# Patient Record
Sex: MALE | Race: WHITE | NOT HISPANIC OR LATINO | Employment: FULL TIME | URBAN - METROPOLITAN AREA
[De-identification: names, ages, dates, MRNs, and addresses within clinical notes are randomized per-mention and may not be internally consistent; named-entity substitution may affect disease eponyms.]

---

## 2021-10-21 ENCOUNTER — TRANSFERRED RECORDS (OUTPATIENT)
Dept: HEALTH INFORMATION MANAGEMENT | Facility: CLINIC | Age: 51
End: 2021-10-21
Payer: COMMERCIAL

## 2021-10-28 DIAGNOSIS — Z11.59 ENCOUNTER FOR SCREENING FOR OTHER VIRAL DISEASES: ICD-10-CM

## 2021-10-31 ENCOUNTER — LAB (OUTPATIENT)
Dept: LAB | Facility: CLINIC | Age: 51
End: 2021-10-31
Attending: INTERNAL MEDICINE
Payer: COMMERCIAL

## 2021-10-31 DIAGNOSIS — Z11.59 ENCOUNTER FOR SCREENING FOR OTHER VIRAL DISEASES: ICD-10-CM

## 2021-10-31 LAB — SARS-COV-2 RNA RESP QL NAA+PROBE: NEGATIVE

## 2021-10-31 PROCEDURE — U0003 INFECTIOUS AGENT DETECTION BY NUCLEIC ACID (DNA OR RNA); SEVERE ACUTE RESPIRATORY SYNDROME CORONAVIRUS 2 (SARS-COV-2) (CORONAVIRUS DISEASE [COVID-19]), AMPLIFIED PROBE TECHNIQUE, MAKING USE OF HIGH THROUGHPUT TECHNOLOGIES AS DESCRIBED BY CMS-2020-01-R: HCPCS

## 2021-10-31 PROCEDURE — U0005 INFEC AGEN DETEC AMPLI PROBE: HCPCS

## 2021-11-01 ENCOUNTER — ANESTHESIA EVENT (OUTPATIENT)
Dept: GASTROENTEROLOGY | Facility: CLINIC | Age: 51
End: 2021-11-01
Payer: COMMERCIAL

## 2021-11-01 NOTE — H&P
Copied & Pasted H&P:                             [email or web address]      [email or web address]    Main Phone [phone]                 MD AVILA CARDONA MD KEVIN R. GRULLON, MD JONATHAN T. HOVDA, MD ERIK YI, MD STEVEN SALDANA, MD CHELY JERONIMO, PAC   ALEXIS HUITRON, PAC   FAVIAN GLOVER, PAC   ABIEL MAGDALENO, APRN, CNP   JOVANA AVILA, APRN, CNP   KARYN WILLIAMSON, PAC              Patient       Tristen Rodríguez   Date of Birth       1970     Age:  51 years  Date of Visit       10/21/2021 03:30 PM  Visit Type       Office Visit  Provider Archie Le MD      Office       [facility]   Historian       self  Referring Provider            Arvin Jj MD   This 51 year old male presents for Cough.    History of Present Illness    1.  Cough   The cough has been present since last office visit. The patient perceives the cough as being mildly severe. It lasts varies. The patient describes the cough as moist and productive clear. The symptoms occur frequently. Recently the cough has changed. Symptom is aggravated by postnasal drip. Symptom is relieved by abx and prednisone. Associated symptoms include excessive sputum and post-nasal drainage. Pertinent negatives include chest pain, dyspnea at rest, dyspnea on exertion, heartburn, hemoptysis and reflux.      Pt returns to clinic for a follow up on asthma, LOV 6/2019  The cough that originally brought him in has resolved and now he has a different cough problem  His breathing has been not so great, states he has a lot of mucus in his lungs every day, bright yellow   Hx of silent reflux, saw reflux surgeon, had Linx device & hernia repair in January 2020 - resolved  Lungs continued to feel wet - did have a course of abx (azithromycin & prednisone) and he felt that things greatly improved for a while, but then they returned     A friend of his who is an EMT has put the concern in him that  it could be cardiac related   Hx of COVID mild case in November 2020 - basically he just felt tired    Now on AirDuo 232/14, has Ventolin HFA, Singulair, Zyrtec        # Detail Type Description    1. Assessment Severe persistent asthma, uncomplicated (J45.50).    Plan Orders Further diagnostic evaluations ordered today include(s) Bronchoscopy to be performed and CT Chest WITHOUT Contrast to be performed.         2. Assessment Personal history of pneumonia (recurrent) (Z87.01).         3. Assessment Allergic rhinitis, unspecified (J30.9).         4. Assessment GERD without esophagitis (K21.9).          Assessment Details  Tristen is a 51-year-old non-smoker referred here today for evaluation of persistent cough and wheezing for the last year as well as abnormal CT scan of the chest. 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  Cough is intermittently productive of some sputum.  Prior CT scan of the chest with rather notable left lower lobe infiltrate.  After couple courses of antibiotics, he is feeling better.  Patient describes lab work-up including low immunoglobulins.  Spirometry, lung volumes, diffusion are normal.  Saw GI, had LINX and repeat EGD with minimal evidence of reflux. Sig cough, sputum production and wheezing. George remains normal. Will get CT chest and bronch.    Plan:   -nasal rinse, then flonase, then zyrtec  -continue symbicort  -continue singulair  -start spiriva, two puffs, once daily  -continue reflux medication  -pre-exercise albuterol    -repeat CT scan of chest @ [facility] in [place]  -labwork (to be done same day as bronchoscopy)  -bronchoscopy at [facility] with Dr Le    Patient Instructions  -nasal rinse, then flonase, then zyrtec  -continue symbicort  -continue singulair  -start spiriva, two puffs, once daily  -continue reflux medication  -pre-exercise albuterol    -repeat CT scan of chest @ [facility] in [place]  -labwork (to be done same day as  bronchoscopy)  -bronchoscopy at [facility] with Dr Le   Medical/Surgical/Interim History  Reviewed, no change.   Last detailed document date:05/01/2019.   Family History  Reviewed, no changes.  Last detailed document: 05/01/2019.   Social History  Tobacco use reviewed.  Reviewed, no changes. Last detailed document date: 05/01/2019.        Medications (active prior to today)  Medication Instructions Start Date Stop Date Refilled Elsewhere   Prilosec  // 10/21/2021  Y   ranitidine 75 mg tablet  // 10/21/2021  Y   Ventolin HFA 90 mcg/actuation aerosol inhaler  //   Y   Nasonex  // 10/21/2021  Y   Symbicort 160 mcg-4.5 mcg/actuation HFA aerosol inhaler inhale 2 puff by inhalation route 2 times every day in the morning and evening 05/08/2019 10/21/2021 05/08/2019 N   Singulair  //   Y   AirDuo RespiClick  //   Y   Zyrtec 10 mg tablet  //   Y     Allergies  Ingredient Reaction Medication Name Comment   NO KNOWN ALLERGIES      (Reviewed, no changes.)    Review of Systems  System Neg/Pos Details   Constitutional Negative Chills, Fever and Pain.   ENMT Positive Post-nasal drainage.   ENMT Negative Dysphagia and Sore throat.   Respiratory Positive Cough, Dyspnea, Excessive sputum.   Respiratory Negative Dyspnea at rest, Dyspnea on exertion, Hemoptysis and Wheezing.   Cardio Negative Chest pain and Edema.   GI Negative Abdominal cramping, Abdominal pain, Heartburn and Reflux.    Negative Dysuria and Polyuria (Genitourinary).   Endocrine Negative Cold intolerance and Heat intolerance.   Neuro Negative Confusion/disorientation and Numbness in extremity.   Psych Negative Anxiety and Depression.   Integumentary Negative Acne and Hives.   MS Negative Back pain and Joint swelling.   Hema/Lymph Negative Easy bleeding and Easy bruising.   Allergic/Immuno Positive Allergic rhinitis.   Allergic/Immuno Negative Frequent infections.   Vital Signs   Time BP mm/Hg Pulse /min Resp /min Temp F Ht ft Ht in Ht cm Wt lb Wt kg Weight % BMI kg/m2  BMI % BSA m2 O2 Sat% (rest) O2 Sat% (amb)   3:50 PM   16  6.0 0.00 182.88 219.00 99.337  29.70 0  96    Pulse Oximetry Results:  Min SpO2 O2 Type FiO2 Method Context Pulse Pattern Score Indication Dx code Comment    96 room air  finger probe left hand digit 2 while at rest-sitting             Physical Exam  Exam Findings Details   Constitutional Normal Well developed.   Respiratory * Auscultation - Findings: wheezing-moderate.   Cardiovascular Normal RRR, No murmur   Respiratory Normal Inspection - Normal. Effort - Normal.   Extremity Normal No edema.       Medications (Added, Continued or Stopped today)  Started Medication Directions Instruction Stopped    AirDuo RespiClick       Nasonex   10/21/2021    Prilosec   10/21/2021    ranitidine 75 mg tablet   10/21/2021    Singulair      10/21/2021 Spiriva Respimat 1.25 mcg/actuation solution for inhalation inhale 2 puff by inhalation route  every day     05/08/2019 Symbicort 160 mcg-4.5 mcg/actuation HFA aerosol inhaler inhale 2 puff by inhalation route 2 times every day in the morning and evening  10/21/2021    Ventolin HFA 90 mcg/actuation aerosol inhaler       Zyrtec 10 mg tablet      Provider:   Archie Le MD     Document generated by:  Archie Le 10/21/2021 05:15 PM          Electronically signed by Archie Le MD on 10/21/2021 05:15 PM

## 2021-11-02 ENCOUNTER — APPOINTMENT (OUTPATIENT)
Dept: GENERAL RADIOLOGY | Facility: CLINIC | Age: 51
End: 2021-11-02
Attending: INTERNAL MEDICINE
Payer: COMMERCIAL

## 2021-11-02 ENCOUNTER — HOSPITAL ENCOUNTER (OUTPATIENT)
Facility: CLINIC | Age: 51
Discharge: HOME | End: 2021-11-02
Attending: INTERNAL MEDICINE | Admitting: INTERNAL MEDICINE
Payer: COMMERCIAL

## 2021-11-02 ENCOUNTER — ANESTHESIA (OUTPATIENT)
Dept: GASTROENTEROLOGY | Facility: CLINIC | Age: 51
End: 2021-11-02
Payer: COMMERCIAL

## 2021-11-02 VITALS
RESPIRATION RATE: 18 BRPM | BODY MASS INDEX: 29.8 KG/M2 | OXYGEN SATURATION: 95 % | WEIGHT: 220 LBS | HEIGHT: 72 IN | SYSTOLIC BLOOD PRESSURE: 130 MMHG | DIASTOLIC BLOOD PRESSURE: 81 MMHG | HEART RATE: 59 BPM | TEMPERATURE: 97.9 F

## 2021-11-02 LAB
APPEARANCE FLD: ABNORMAL
APPEARANCE FLD: CLEAR
BASOPHILS # BLD AUTO: 0.1 10E3/UL (ref 0–0.2)
BASOPHILS NFR BLD AUTO: 2 %
BRONCHOSCOPY: NORMAL
COLOR FLD: COLORLESS
COLOR FLD: COLORLESS
CRP SERPL-MCNC: <2.9 MG/L (ref 0–8)
EOSINOPHIL # BLD AUTO: 0.3 10E3/UL (ref 0–0.7)
EOSINOPHIL NFR BLD AUTO: 5 %
EOSINOPHIL NFR FLD MANUAL: 1 %
ERYTHROCYTE [DISTWIDTH] IN BLOOD BY AUTOMATED COUNT: 12.3 % (ref 10–15)
ERYTHROCYTE [SEDIMENTATION RATE] IN BLOOD BY WESTERGREN METHOD: 7 MM/HR (ref 0–20)
HCT VFR BLD AUTO: 43.6 % (ref 40–53)
HGB BLD-MCNC: 14.3 G/DL (ref 13.3–17.7)
IMM GRANULOCYTES # BLD: 0 10E3/UL
IMM GRANULOCYTES NFR BLD: 0 %
LYMPHOCYTES # BLD AUTO: 1.7 10E3/UL (ref 0.8–5.3)
LYMPHOCYTES NFR BLD AUTO: 26 %
LYMPHOCYTES NFR FLD MANUAL: 2 %
LYMPHOCYTES NFR FLD MANUAL: 6 %
MCH RBC QN AUTO: 29.9 PG (ref 26.5–33)
MCHC RBC AUTO-ENTMCNC: 32.8 G/DL (ref 31.5–36.5)
MCV RBC AUTO: 91 FL (ref 78–100)
MONOCYTES # BLD AUTO: 0.6 10E3/UL (ref 0–1.3)
MONOCYTES NFR BLD AUTO: 9 %
MONOS+MACROS NFR FLD MANUAL: 7 %
MONOS+MACROS NFR FLD MANUAL: 9 %
NEUTROPHILS # BLD AUTO: 3.8 10E3/UL (ref 1.6–8.3)
NEUTROPHILS NFR BLD AUTO: 58 %
NEUTS BAND NFR FLD MANUAL: 86 %
NEUTS BAND NFR FLD MANUAL: 89 %
NRBC # BLD AUTO: 0 10E3/UL
NRBC BLD AUTO-RTO: 0 /100
PLATELET # BLD AUTO: 259 10E3/UL (ref 150–450)
RBC # BLD AUTO: 4.78 10E6/UL (ref 4.4–5.9)
WBC # BLD AUTO: 6.5 10E3/UL (ref 4–11)
WBC # FLD AUTO: 367 /UL
WBC # FLD AUTO: 63 /UL

## 2021-11-02 PROCEDURE — 31624 DX BRONCHOSCOPE/LAVAGE: CPT | Performed by: INTERNAL MEDICINE

## 2021-11-02 PROCEDURE — 250N000009 HC RX 250: Performed by: INTERNAL MEDICINE

## 2021-11-02 PROCEDURE — 87186 SC STD MICRODIL/AGAR DIL: CPT | Performed by: INTERNAL MEDICINE

## 2021-11-02 PROCEDURE — 31625 BRONCHOSCOPY W/BIOPSY(S): CPT | Performed by: INTERNAL MEDICINE

## 2021-11-02 PROCEDURE — 250N000025 HC SEVOFLURANE, PER MIN: Performed by: INTERNAL MEDICINE

## 2021-11-02 PROCEDURE — 87205 SMEAR GRAM STAIN: CPT | Performed by: INTERNAL MEDICINE

## 2021-11-02 PROCEDURE — 84999 UNLISTED CHEMISTRY PROCEDURE: CPT | Performed by: INTERNAL MEDICINE

## 2021-11-02 PROCEDURE — 87581 M.PNEUMON DNA AMP PROBE: CPT | Performed by: INTERNAL MEDICINE

## 2021-11-02 PROCEDURE — 87102 FUNGUS ISOLATION CULTURE: CPT | Performed by: INTERNAL MEDICINE

## 2021-11-02 PROCEDURE — 86140 C-REACTIVE PROTEIN: CPT | Performed by: INTERNAL MEDICINE

## 2021-11-02 PROCEDURE — 370N000017 HC ANESTHESIA TECHNICAL FEE, PER MIN: Performed by: INTERNAL MEDICINE

## 2021-11-02 PROCEDURE — 87070 CULTURE OTHR SPECIMN AEROBIC: CPT | Performed by: INTERNAL MEDICINE

## 2021-11-02 PROCEDURE — 87116 MYCOBACTERIA CULTURE: CPT | Performed by: INTERNAL MEDICINE

## 2021-11-02 PROCEDURE — 87305 ASPERGILLUS AG IA: CPT | Performed by: INTERNAL MEDICINE

## 2021-11-02 PROCEDURE — 87210 SMEAR WET MOUNT SALINE/INK: CPT | Performed by: INTERNAL MEDICINE

## 2021-11-02 PROCEDURE — 36415 COLL VENOUS BLD VENIPUNCTURE: CPT | Performed by: INTERNAL MEDICINE

## 2021-11-02 PROCEDURE — 71045 X-RAY EXAM CHEST 1 VIEW: CPT

## 2021-11-02 PROCEDURE — 85652 RBC SED RATE AUTOMATED: CPT | Performed by: INTERNAL MEDICINE

## 2021-11-02 PROCEDURE — 85025 COMPLETE CBC W/AUTO DIFF WBC: CPT | Performed by: INTERNAL MEDICINE

## 2021-11-02 PROCEDURE — 89050 BODY FLUID CELL COUNT: CPT | Performed by: INTERNAL MEDICINE

## 2021-11-02 PROCEDURE — 250N000011 HC RX IP 250 OP 636: Performed by: NURSE ANESTHETIST, CERTIFIED REGISTERED

## 2021-11-02 PROCEDURE — 87107 FUNGI IDENTIFICATION MOLD: CPT | Performed by: INTERNAL MEDICINE

## 2021-11-02 PROCEDURE — 250N000009 HC RX 250: Performed by: NURSE ANESTHETIST, CERTIFIED REGISTERED

## 2021-11-02 PROCEDURE — 87081 CULTURE SCREEN ONLY: CPT | Performed by: INTERNAL MEDICINE

## 2021-11-02 PROCEDURE — 250N000009 HC RX 250: Performed by: ANESTHESIOLOGY

## 2021-11-02 PROCEDURE — 88305 TISSUE EXAM BY PATHOLOGIST: CPT | Mod: TC | Performed by: INTERNAL MEDICINE

## 2021-11-02 PROCEDURE — 258N000003 HC RX IP 258 OP 636: Performed by: NURSE ANESTHETIST, CERTIFIED REGISTERED

## 2021-11-02 PROCEDURE — 82787 IGG 1 2 3 OR 4 EACH: CPT | Performed by: INTERNAL MEDICINE

## 2021-11-02 PROCEDURE — 82785 ASSAY OF IGE: CPT | Performed by: INTERNAL MEDICINE

## 2021-11-02 PROCEDURE — 89051 BODY FLUID CELL COUNT: CPT | Performed by: INTERNAL MEDICINE

## 2021-11-02 PROCEDURE — 87076 CULTURE ANAEROBE IDENT EACH: CPT | Performed by: INTERNAL MEDICINE

## 2021-11-02 PROCEDURE — 82784 ASSAY IGA/IGD/IGG/IGM EACH: CPT | Performed by: INTERNAL MEDICINE

## 2021-11-02 PROCEDURE — 88108 CYTOPATH CONCENTRATE TECH: CPT | Mod: TC | Performed by: INTERNAL MEDICINE

## 2021-11-02 PROCEDURE — 86431 RHEUMATOID FACTOR QUANT: CPT | Performed by: INTERNAL MEDICINE

## 2021-11-02 PROCEDURE — 31623 DX BRONCHOSCOPE/BRUSH: CPT | Performed by: INTERNAL MEDICINE

## 2021-11-02 PROCEDURE — 999N000010 HC STATISTIC ANES STAT CODE-CRNA PER MINUTE: Performed by: INTERNAL MEDICINE

## 2021-11-02 RX ORDER — ALBUTEROL SULFATE 0.83 MG/ML
2.5 SOLUTION RESPIRATORY (INHALATION) ONCE
Status: COMPLETED | OUTPATIENT
Start: 2021-11-02 | End: 2021-11-02

## 2021-11-02 RX ORDER — SODIUM CHLORIDE, SODIUM LACTATE, POTASSIUM CHLORIDE, CALCIUM CHLORIDE 600; 310; 30; 20 MG/100ML; MG/100ML; MG/100ML; MG/100ML
INJECTION, SOLUTION INTRAVENOUS CONTINUOUS PRN
Status: DISCONTINUED | OUTPATIENT
Start: 2021-11-02 | End: 2021-11-02

## 2021-11-02 RX ORDER — CETIRIZINE HYDROCHLORIDE 10 MG/1
10 TABLET ORAL DAILY
COMMUNITY

## 2021-11-02 RX ORDER — SODIUM CHLORIDE, SODIUM LACTATE, POTASSIUM CHLORIDE, CALCIUM CHLORIDE 600; 310; 30; 20 MG/100ML; MG/100ML; MG/100ML; MG/100ML
INJECTION, SOLUTION INTRAVENOUS CONTINUOUS
Status: DISCONTINUED | OUTPATIENT
Start: 2021-11-02 | End: 2021-11-02 | Stop reason: HOSPADM

## 2021-11-02 RX ORDER — ONDANSETRON 4 MG/1
4 TABLET, ORALLY DISINTEGRATING ORAL EVERY 30 MIN PRN
Status: DISCONTINUED | OUTPATIENT
Start: 2021-11-02 | End: 2021-11-02 | Stop reason: HOSPADM

## 2021-11-02 RX ORDER — LIDOCAINE HYDROCHLORIDE 20 MG/ML
INJECTION, SOLUTION INFILTRATION; PERINEURAL PRN
Status: DISCONTINUED | OUTPATIENT
Start: 2021-11-02 | End: 2021-11-02

## 2021-11-02 RX ORDER — ONDANSETRON 2 MG/ML
4 INJECTION INTRAMUSCULAR; INTRAVENOUS EVERY 30 MIN PRN
Status: DISCONTINUED | OUTPATIENT
Start: 2021-11-02 | End: 2021-11-02 | Stop reason: HOSPADM

## 2021-11-02 RX ORDER — DEXAMETHASONE SODIUM PHOSPHATE 4 MG/ML
INJECTION, SOLUTION INTRA-ARTICULAR; INTRALESIONAL; INTRAMUSCULAR; INTRAVENOUS; SOFT TISSUE PRN
Status: DISCONTINUED | OUTPATIENT
Start: 2021-11-02 | End: 2021-11-02

## 2021-11-02 RX ORDER — MAGNESIUM HYDROXIDE 1200 MG/15ML
LIQUID ORAL PRN
Status: COMPLETED | OUTPATIENT
Start: 2021-11-02 | End: 2021-11-02

## 2021-11-02 RX ORDER — PROPOFOL 10 MG/ML
INJECTION, EMULSION INTRAVENOUS CONTINUOUS PRN
Status: DISCONTINUED | OUTPATIENT
Start: 2021-11-02 | End: 2021-11-02

## 2021-11-02 RX ORDER — NALOXONE HYDROCHLORIDE 0.4 MG/ML
0.2 INJECTION, SOLUTION INTRAMUSCULAR; INTRAVENOUS; SUBCUTANEOUS
Status: DISCONTINUED | OUTPATIENT
Start: 2021-11-02 | End: 2021-11-02 | Stop reason: HOSPADM

## 2021-11-02 RX ORDER — ONDANSETRON 4 MG/1
4 TABLET, ORALLY DISINTEGRATING ORAL EVERY 6 HOURS PRN
Status: DISCONTINUED | OUTPATIENT
Start: 2021-11-02 | End: 2021-11-02 | Stop reason: HOSPADM

## 2021-11-02 RX ORDER — ONDANSETRON 2 MG/ML
4 INJECTION INTRAMUSCULAR; INTRAVENOUS EVERY 6 HOURS PRN
Status: DISCONTINUED | OUTPATIENT
Start: 2021-11-02 | End: 2021-11-02 | Stop reason: HOSPADM

## 2021-11-02 RX ORDER — ONDANSETRON 2 MG/ML
INJECTION INTRAMUSCULAR; INTRAVENOUS PRN
Status: DISCONTINUED | OUTPATIENT
Start: 2021-11-02 | End: 2021-11-02

## 2021-11-02 RX ORDER — NALOXONE HYDROCHLORIDE 0.4 MG/ML
0.4 INJECTION, SOLUTION INTRAMUSCULAR; INTRAVENOUS; SUBCUTANEOUS
Status: DISCONTINUED | OUTPATIENT
Start: 2021-11-02 | End: 2021-11-02 | Stop reason: HOSPADM

## 2021-11-02 RX ORDER — MONTELUKAST SODIUM 10 MG/1
10 TABLET ORAL AT BEDTIME
COMMUNITY

## 2021-11-02 RX ORDER — FLUMAZENIL 0.1 MG/ML
0.2 INJECTION, SOLUTION INTRAVENOUS
Status: DISCONTINUED | OUTPATIENT
Start: 2021-11-02 | End: 2021-11-02 | Stop reason: HOSPADM

## 2021-11-02 RX ORDER — PROPOFOL 10 MG/ML
INJECTION, EMULSION INTRAVENOUS PRN
Status: DISCONTINUED | OUTPATIENT
Start: 2021-11-02 | End: 2021-11-02

## 2021-11-02 RX ADMIN — LIDOCAINE HYDROCHLORIDE 100 MG: 20 INJECTION, SOLUTION INFILTRATION; PERINEURAL at 15:10

## 2021-11-02 RX ADMIN — ALBUTEROL SULFATE 2.5 MG: 2.5 SOLUTION RESPIRATORY (INHALATION) at 14:49

## 2021-11-02 RX ADMIN — SODIUM CHLORIDE, POTASSIUM CHLORIDE, SODIUM LACTATE AND CALCIUM CHLORIDE: 600; 310; 30; 20 INJECTION, SOLUTION INTRAVENOUS at 15:06

## 2021-11-02 RX ADMIN — DEXAMETHASONE SODIUM PHOSPHATE 4 MG: 4 INJECTION, SOLUTION INTRA-ARTICULAR; INTRALESIONAL; INTRAMUSCULAR; INTRAVENOUS; SOFT TISSUE at 15:18

## 2021-11-02 RX ADMIN — SODIUM CHLORIDE 260 ML: 0.9 IRRIGANT IRRIGATION at 15:33

## 2021-11-02 RX ADMIN — Medication 20 MCG: at 15:15

## 2021-11-02 RX ADMIN — SUCCINYLCHOLINE CHLORIDE 100 MG: 20 INJECTION, SOLUTION INTRAMUSCULAR; INTRAVENOUS; PARENTERAL at 15:10

## 2021-11-02 RX ADMIN — SUGAMMADEX 200 MG: 100 INJECTION, SOLUTION INTRAVENOUS at 15:41

## 2021-11-02 RX ADMIN — PROPOFOL 200 MG: 10 INJECTION, EMULSION INTRAVENOUS at 15:10

## 2021-11-02 RX ADMIN — ONDANSETRON 4 MG: 2 INJECTION INTRAMUSCULAR; INTRAVENOUS at 15:18

## 2021-11-02 RX ADMIN — ROCURONIUM BROMIDE 30 MG: 50 INJECTION, SOLUTION INTRAVENOUS at 15:15

## 2021-11-02 RX ADMIN — PROPOFOL 150 MCG/KG/MIN: 10 INJECTION, EMULSION INTRAVENOUS at 15:10

## 2021-11-02 ASSESSMENT — MIFFLIN-ST. JEOR: SCORE: 1895.91

## 2021-11-02 ASSESSMENT — LIFESTYLE VARIABLES: TOBACCO_USE: 0

## 2021-11-02 ASSESSMENT — ENCOUNTER SYMPTOMS
DYSRHYTHMIAS: 0
SEIZURES: 0

## 2021-11-02 NOTE — H&P
Pre procedure note      PATIENT NAME: Tristen Rodríguez   MRN:  [id number]  YOB: 1970  Age: 51 year old  VISIT DATE: 10/28/2021      _______________________________________________________________________  Procedure: Bronchoscopy   Indications: cough, bibasilar bronchiectasis / infiltrate  Bronchoscopist: Archie Le MD (Doctor)   General anesthesia/Concious sedation    _______________________________________________________________________    Prior to the procedure a detailed evaluation of the patient's  Chest CT scan was done with detailed planning of the approach route to the areas of interest.  Procedure: Pre-Anesthesia Assessment:     - A History and Physical has been performed. Patient meds and allergies have been reviewed. The risks and benefits of the procedure and the sedation options and risks were discussed with the patient. All questions were answered and informed consent was obtained. Patient identification and proposed procedure were verified prior to the procedure by the physician and the nurse in the procedure room. Mental   Status Examination: alert and oriented. Airway Examination: normal oropharyngeal airway and Mallampati Class 1.    ASA Grade Assessment:3 -  After reviewing the risks and benefits, the patient was deemed in satisfactory condition to undergo the procedure.       A History and Physical has been performed. Patient meds and allergies have been reviewed. The risks and benefits of the procedure and the sedation options and risks were discussed with the patient. All questions were answered and informed consent was obtained.       Archie Le M.D.  [facility] / [facility]  [phone] (pager)  [phone]   (office)

## 2021-11-02 NOTE — ANESTHESIA CARE TRANSFER NOTE
Patient: Tristen Rodríguez    Procedure: Procedure(s):  BRONCHOSCOPY, WITH BRONCHOALVEOLAR LAVAGE  Bronchoscopy, With Biopsy  Bronchoscopy, With [place] Biopsy       Diagnosis: Personal history of pneumonia (recurrent) [Z87.01]  Diagnosis Additional Information: No value filed.    Anesthesia Type:   General     Note:    Oropharynx: oropharynx clear of all foreign objects  Level of Consciousness: awake  Oxygen Supplementation: nasal cannula    Independent Airway: airway patency satisfactory and stable  Dentition: dentition unchanged  Vital Signs Stable: post-procedure vital signs reviewed and stable  Report to RN Given: handoff report given  Patient transferred to: PACU    Handoff Report: Identifed the Patient, Identified the Reponsible Provider, Reviewed the pertinent medical history, Discussed the surgical course, Reviewed Intra-OP anesthesia mangement and issues during anesthesia, Set expectations for post-procedure period and Allowed opportunity for questions and acknowledgement of understanding      Vitals:  Vitals Value Taken Time   /80    Temp     Pulse 68    Resp 12    SpO2 94 % 11/02/21 1601   Vitals shown include unvalidated device data.    Electronically Signed By: THERESA Aguirre CRNA  November 2, 2021  4:02 PM

## 2021-11-02 NOTE — ANESTHESIA PROCEDURE NOTES
Airway       Patient location: endoscopy suite.       Procedure Start/Stop Times: 11/2/2021 3:11 PM  Staff -        Anesthesiologist:  Marcus Kerns MD       CRNA: Edel Theodore APRN CRNA       Performed By: CRNA  Consent for Airway        Urgency: elective  Indications and Patient Condition       Indications for airway management: dajuan-procedural       Induction type:RSI       Mask difficulty assessment: 0 - not attempted    Final Airway Details       Final airway type: endotracheal airway       Successful airway: ETT - single  Endotracheal Airway Details        ETT size (mm): 8.5       Cuffed: yes       Cuff volume (mL): 10       Successful intubation technique: video laryngoscopy       VL Blade Size: [place] 4       Grade View of Cords: 1       Adjucts: stylet       Position: Right       Measured from: lips       Secured at (cm): 24       Bite block used: None    Post intubation assessment        Placement verified by: capnometry, equal breath sounds and chest rise        Number of attempts at approach: 1       Secured with: pink tape       Ease of procedure: easy       Dentition: Intact and Unchanged

## 2021-11-02 NOTE — OR NURSING
Reviewed patient discharge instructions provided by Dr. Le.  Patient verbalized understanding of instructions and has phone numbers to call if concerns or questions once he is home.  Walked patient to , steady on his feet.

## 2021-11-02 NOTE — ANESTHESIA PREPROCEDURE EVALUATION
Anesthesia Pre-Procedure Evaluation    Patient: Tristen Rodríguez   MRN: [id number] : 1970        Preoperative Diagnosis: Personal history of pneumonia (recurrent) [Z87.01]    Procedure : Procedure(s):  BRONCHOSCOPY          Past Medical History:   Diagnosis Date     Seasonal allergies      Uncomplicated asthma       Past Surgical History:   Procedure Laterality Date     HERNIA REPAIR      early      linx inplant       MANDIBLE SURGERY       oral implants        No Known Allergies   Social History     Tobacco Use     Smoking status: Never Smoker     Smokeless tobacco: Never Used   Substance Use Topics     Alcohol use: Yes     Comment: 2 drinks/noc      Wt Readings from Last 1 Encounters:   No data found for Wt        Anesthesia Evaluation   Pt has had prior anesthetic. Type: General.    No history of anesthetic complications       ROS/MED HX  ENT/Pulmonary: Comment: Chronic cough    (+) asthma  (-) tobacco use and sleep apnea   Neurologic:     (+) no peripheral neuropathy  (-) no seizures and no CVA   Cardiovascular:    (-) hypertension, CAD and arrhythmias   METS/Exercise Tolerance:     Hematologic:       Musculoskeletal: Comment: Recent R biceps rupture      GI/Hepatic:     (+) GERD (controlled with implant),     Renal/Genitourinary:    (-) renal disease   Endo:    (-) Type II DM and thyroid disease   Psychiatric/Substance Use:       Infectious Disease:    (-) Recent Fever   Malignancy:       Other:            Physical Exam    Airway  airway exam normal      Mallampati: I   TM distance: > 3 FB   Neck ROM: full   Mouth opening: > 3 cm    Respiratory Devices and Support         Dental  no notable dental history         Cardiovascular   cardiovascular exam normal          Pulmonary           (+) rhonchi           OUTSIDE LABS:  CBC: No results found for: WBC, HGB, HCT, PLT  BMP: No results found for: NA, POTASSIUM, CHLORIDE, CO2, BUN, CR, GLC  COAGS: No results found for: PTT, INR, FIBR  POC: No results  found for: BGM, HCG, HCGS  HEPATIC: No results found for: ALBUMIN, PROTTOTAL, ALT, AST, GGT, ALKPHOS, BILITOTAL, BILIDIRECT, ADDIE  OTHER: No results found for: PH, LACT, A1C, TIP, PHOS, MAG, LIPASE, AMYLASE, TSH, T4, T3, CRP, SED    Anesthesia Plan    ASA Status:  3   NPO Status:  NPO Appropriate    Anesthesia Type: General.     - Airway: ETT   Induction: Intravenous.   Maintenance: TIVA.        Consents    Anesthesia Plan(s) and associated risks, benefits, and realistic alternatives discussed. Questions answered and patient/representative(s) expressed understanding.     - Discussed with:  Patient         Postoperative Care    Pain management: IV analgesics, Oral pain medications.   PONV prophylaxis: Ondansetron (or other 5HT-3), Dexamethasone or Solumedrol, Background Propofol Infusion     Comments:    Pre-op neb.    Had some lightheaded/dizziness after biceps injury this past weekend. No sx since.            Scott Lunsford MD

## 2021-11-03 LAB
GALACTOMANNAN AG BAL QL: NEGATIVE
GALACTOMANNAN AG SPEC IA-ACNC: 0.13
GRAM STAIN RESULT: ABNORMAL
GRAM STAIN RESULT: ABNORMAL
IGA SERPL-MCNC: 284 MG/DL (ref 84–499)
IGE SERPL-ACNC: 9 KU/L (ref 0–114)
IGG SERPL-MCNC: 740 MG/DL (ref 610–1616)
IGM SERPL-MCNC: 35 MG/DL (ref 35–242)
KOH PREPARATION: NORMAL
KOH PREPARATION: NORMAL

## 2021-11-03 NOTE — ANESTHESIA POSTPROCEDURE EVALUATION
Patient: Tristen Rodríguez    Procedure: Procedure(s):  BRONCHOSCOPY, WITH BRONCHOALVEOLAR LAVAGE  Bronchoscopy, With Biopsy  Bronchoscopy, With Brush Biopsy       Diagnosis:Personal history of pneumonia (recurrent) [Z87.01]  Diagnosis Additional Information: No value filed.    Anesthesia Type:  General    Note:  Disposition: Outpatient   Postop Pain Control: Uneventful            Sign Out: Well controlled pain   PONV: No   Neuro/Psych: Uneventful            Sign Out: Acceptable/Baseline neuro status   Airway/Respiratory: Uneventful            Sign Out: Acceptable/Baseline resp. status   CV/Hemodynamics: Uneventful            Sign Out: Acceptable CV status; No obvious hypovolemia; No obvious fluid overload   Other NRE: NONE   DID A NON-ROUTINE EVENT OCCUR? No           Last vitals:  Vitals Value Taken Time   /81 11/02/21 1630   Temp 36.6  C (97.9  F) 11/02/21 1615   Pulse 61 11/02/21 1636   Resp 18 11/02/21 1630   SpO2 94 % 11/02/21 1638   Vitals shown include unvalidated device data.    Electronically Signed By: Chris Russo DO, DO  November 2, 2021  10:09 PM

## 2021-11-04 LAB
PATH REPORT.COMMENTS IMP SPEC: NORMAL
PATH REPORT.FINAL DX SPEC: NORMAL
PATH REPORT.FINAL DX SPEC: NORMAL
PATH REPORT.GROSS SPEC: NORMAL
PATH REPORT.GROSS SPEC: NORMAL
PATH REPORT.MICROSCOPIC SPEC OTHER STN: NORMAL
PATH REPORT.MICROSCOPIC SPEC OTHER STN: NORMAL
PATH REPORT.RELEVANT HX SPEC: NORMAL
PATH REPORT.RELEVANT HX SPEC: NORMAL
PHOTO IMAGE: NORMAL

## 2021-11-04 PROCEDURE — 88312 SPECIAL STAINS GROUP 1: CPT | Mod: 26 | Performed by: PATHOLOGY

## 2021-11-04 PROCEDURE — 88108 CYTOPATH CONCENTRATE TECH: CPT | Mod: 26 | Performed by: PATHOLOGY

## 2021-11-04 PROCEDURE — 88305 TISSUE EXAM BY PATHOLOGIST: CPT | Mod: 26 | Performed by: PATHOLOGY

## 2021-11-05 LAB
IGG SERPL-MCNC: 787 MG/DL (ref 610–1616)
IGG1 SER-MCNC: 486 MG/DL (ref 382–929)
IGG2 SER-MCNC: 119 MG/DL (ref 242–700)
IGG3 SER-MCNC: 45 MG/DL (ref 22–176)
IGG4 SER-MCNC: 10 MG/DL (ref 4–86)
SUBCLASSES, PERCENT: 84 %

## 2021-11-06 LAB
BACTERIA SPEC CULT: ABNORMAL
BACTERIA SPEC CULT: ABNORMAL
M PNEUMO DNA SPEC QL NAA+PROBE: NOT DETECTED

## 2021-11-07 LAB
BACTERIA BRO BRUSH AEROBE CULT: ABNORMAL
BACTERIA BRO BRUSH AEROBE CULT: ABNORMAL

## 2021-11-16 LAB
BACTERIA BRONCH: ABNORMAL
BACTERIA BRONCH: NORMAL

## 2021-11-20 ENCOUNTER — HEALTH MAINTENANCE LETTER (OUTPATIENT)
Age: 51
End: 2021-11-20

## 2021-11-30 LAB
BACTERIA BRO BRUSH AEROBE CULT: NO GROWTH
BACTERIA BRONCH: NO GROWTH
BACTERIA SPEC CULT: ABNORMAL
BACTERIA SPEC CULT: NO GROWTH

## 2021-12-01 LAB — BACTERIA BRO BRUSH AEROBE CULT: NO GROWTH

## 2021-12-16 LAB — RHEUMATOID FACT SER NEPH-ACNC: 15 IU/ML

## 2021-12-21 LAB
Lab: NORMAL
PERFORMING LABORATORY: NORMAL
SCANNED LAB RESULT: NORMAL
TEST NAME: NORMAL

## 2021-12-28 LAB
ACID FAST STAIN (ARUP): NORMAL

## 2021-12-31 LAB — ACID FAST STAIN (ARUP): NORMAL

## 2022-04-25 ENCOUNTER — LAB (OUTPATIENT)
Dept: LAB | Facility: OTHER | Age: 52
End: 2022-04-25
Payer: COMMERCIAL

## 2022-04-25 DIAGNOSIS — Z87.01 PERSONAL HISTORY OF PNEUMONIA (RECURRENT): ICD-10-CM

## 2022-04-25 DIAGNOSIS — J45.50 SEVERE PERSISTENT ASTHMA WITHOUT COMPLICATION (H): Primary | ICD-10-CM

## 2022-04-25 PROCEDURE — 87070 CULTURE OTHR SPECIMN AEROBIC: CPT

## 2022-04-25 PROCEDURE — 87116 MYCOBACTERIA CULTURE: CPT | Mod: 90

## 2022-04-25 PROCEDURE — 87106 FUNGI IDENTIFICATION YEAST: CPT

## 2022-04-25 PROCEDURE — 87205 SMEAR GRAM STAIN: CPT

## 2022-04-25 PROCEDURE — 99000 SPECIMEN HANDLING OFFICE-LAB: CPT

## 2022-04-25 PROCEDURE — 87102 FUNGUS ISOLATION CULTURE: CPT

## 2022-04-25 PROCEDURE — 87206 SMEAR FLUORESCENT/ACID STAI: CPT | Mod: 90

## 2022-04-26 ENCOUNTER — LAB (OUTPATIENT)
Dept: LAB | Facility: OTHER | Age: 52
End: 2022-04-26
Payer: COMMERCIAL

## 2022-04-26 DIAGNOSIS — Z87.01 PERSONAL HISTORY OF PNEUMONIA (RECURRENT): ICD-10-CM

## 2022-04-26 DIAGNOSIS — J45.50 SEVERE PERSISTENT ASTHMA WITHOUT COMPLICATION (H): ICD-10-CM

## 2022-04-26 PROCEDURE — 99000 SPECIMEN HANDLING OFFICE-LAB: CPT

## 2022-04-26 PROCEDURE — 87206 SMEAR FLUORESCENT/ACID STAI: CPT | Mod: 90

## 2022-04-26 PROCEDURE — 87205 SMEAR GRAM STAIN: CPT

## 2022-04-26 PROCEDURE — 87186 SC STD MICRODIL/AGAR DIL: CPT | Mod: 59

## 2022-04-26 PROCEDURE — 87102 FUNGUS ISOLATION CULTURE: CPT

## 2022-04-26 PROCEDURE — 87106 FUNGI IDENTIFICATION YEAST: CPT

## 2022-04-26 PROCEDURE — 87070 CULTURE OTHR SPECIMN AEROBIC: CPT

## 2022-04-26 PROCEDURE — 87077 CULTURE AEROBIC IDENTIFY: CPT | Mod: 59

## 2022-04-26 PROCEDURE — 87116 MYCOBACTERIA CULTURE: CPT | Mod: 90

## 2022-04-27 ENCOUNTER — LAB (OUTPATIENT)
Dept: LAB | Facility: OTHER | Age: 52
End: 2022-04-27
Payer: COMMERCIAL

## 2022-04-27 DIAGNOSIS — J45.50 SEVERE PERSISTENT ASTHMA WITHOUT COMPLICATION (H): ICD-10-CM

## 2022-04-27 DIAGNOSIS — Z87.01 PERSONAL HISTORY OF PNEUMONIA (RECURRENT): ICD-10-CM

## 2022-04-27 PROCEDURE — 87070 CULTURE OTHR SPECIMN AEROBIC: CPT

## 2022-04-27 PROCEDURE — 99000 SPECIMEN HANDLING OFFICE-LAB: CPT

## 2022-04-27 PROCEDURE — 87205 SMEAR GRAM STAIN: CPT

## 2022-04-27 PROCEDURE — 87206 SMEAR FLUORESCENT/ACID STAI: CPT | Mod: 90

## 2022-04-27 PROCEDURE — 87116 MYCOBACTERIA CULTURE: CPT | Mod: 90

## 2022-04-28 LAB
BACTERIA SPT CULT: NORMAL
BACTERIA SPT CULT: NORMAL
GRAM STAIN RESULT: NORMAL

## 2022-04-30 LAB
BACTERIA SPT CULT: ABNORMAL
GRAM STAIN RESULT: ABNORMAL

## 2022-05-11 ENCOUNTER — LAB (OUTPATIENT)
Dept: LAB | Facility: OTHER | Age: 52
End: 2022-05-11
Payer: COMMERCIAL

## 2022-05-11 DIAGNOSIS — J47.9 BRONCHIECTASIS (H): Primary | ICD-10-CM

## 2022-05-11 LAB
BACTERIA SPT CULT: NORMAL
GRAM STAIN RESULT: NORMAL

## 2022-05-11 PROCEDURE — 99000 SPECIMEN HANDLING OFFICE-LAB: CPT

## 2022-05-11 PROCEDURE — 87205 SMEAR GRAM STAIN: CPT

## 2022-05-11 PROCEDURE — 87206 SMEAR FLUORESCENT/ACID STAI: CPT | Mod: 90

## 2022-05-11 PROCEDURE — 87116 MYCOBACTERIA CULTURE: CPT | Mod: 90

## 2022-05-12 ENCOUNTER — LAB (OUTPATIENT)
Dept: LAB | Facility: OTHER | Age: 52
End: 2022-05-12
Payer: COMMERCIAL

## 2022-05-12 DIAGNOSIS — J47.9 BRONCHIECTASIS (H): ICD-10-CM

## 2022-05-12 PROCEDURE — 87205 SMEAR GRAM STAIN: CPT

## 2022-05-12 PROCEDURE — 99000 SPECIMEN HANDLING OFFICE-LAB: CPT

## 2022-05-12 PROCEDURE — 87186 SC STD MICRODIL/AGAR DIL: CPT

## 2022-05-12 PROCEDURE — 87206 SMEAR FLUORESCENT/ACID STAI: CPT | Mod: 90

## 2022-05-12 PROCEDURE — 87106 FUNGI IDENTIFICATION YEAST: CPT

## 2022-05-12 PROCEDURE — 87116 MYCOBACTERIA CULTURE: CPT | Mod: 90

## 2022-05-12 PROCEDURE — 87102 FUNGUS ISOLATION CULTURE: CPT

## 2022-05-12 PROCEDURE — 87070 CULTURE OTHR SPECIMN AEROBIC: CPT

## 2022-05-12 PROCEDURE — 87077 CULTURE AEROBIC IDENTIFY: CPT

## 2022-05-15 LAB
BACTERIA SPT CULT: ABNORMAL
BACTERIA SPT CULT: ABNORMAL
GRAM STAIN RESULT: ABNORMAL

## 2022-05-16 ENCOUNTER — LAB (OUTPATIENT)
Dept: LAB | Facility: OTHER | Age: 52
End: 2022-05-16
Payer: COMMERCIAL

## 2022-05-16 DIAGNOSIS — J47.9 BRONCHIECTASIS (H): ICD-10-CM

## 2022-05-16 PROCEDURE — 87205 SMEAR GRAM STAIN: CPT

## 2022-05-16 PROCEDURE — 87206 SMEAR FLUORESCENT/ACID STAI: CPT | Mod: 90

## 2022-05-16 PROCEDURE — 87102 FUNGUS ISOLATION CULTURE: CPT

## 2022-05-16 PROCEDURE — 87070 CULTURE OTHR SPECIMN AEROBIC: CPT

## 2022-05-16 PROCEDURE — 87116 MYCOBACTERIA CULTURE: CPT | Mod: 90

## 2022-05-16 PROCEDURE — 87106 FUNGI IDENTIFICATION YEAST: CPT

## 2022-05-16 PROCEDURE — 99000 SPECIMEN HANDLING OFFICE-LAB: CPT

## 2022-05-16 PROCEDURE — 87077 CULTURE AEROBIC IDENTIFY: CPT

## 2022-05-16 PROCEDURE — 87186 SC STD MICRODIL/AGAR DIL: CPT

## 2022-05-20 LAB
BACTERIA SPT CULT: ABNORMAL
BACTERIA SPT CULT: ABNORMAL
GRAM STAIN RESULT: ABNORMAL

## 2022-05-24 ENCOUNTER — NURSE TRIAGE (OUTPATIENT)
Dept: NURSING | Facility: CLINIC | Age: 52
End: 2022-05-24
Payer: COMMERCIAL

## 2022-05-24 LAB
BACTERIA SPT CULT: ABNORMAL
BACTERIA SPT CULT: ABNORMAL

## 2022-05-24 NOTE — TELEPHONE ENCOUNTER
Triage Call:    [facility] lab calling to advise of critical lab result.  Warm transferred lab to [facility] paging.    Bhumika Purdy RN  05/24/22 6:48 PM  [facility] Nurse Advisor    Reason for Disposition    Lab or radiology calling with CRITICAL test results    Additional Information    Negative: Lab calling with strep throat test results and triager can call in prescription    Negative: Lab calling with urinalysis test results and triager can call in prescription    Negative: Medication questions    Negative: Call about patient who is currently hospitalized    Negative: Physician call to PCP    Negative: ED call to PCP    Protocols used: PCP CALL - NO TRIAGE-A-

## 2022-06-09 LAB — BACTERIA SPT CULT: ABNORMAL

## 2022-06-10 LAB
ACID FAST STAIN (ARUP): ABNORMAL
ORGANISM (ARUP): ABNORMAL

## 2022-06-13 LAB — BACTERIA SPT CULT: ABNORMAL

## 2022-06-16 ENCOUNTER — TELEPHONE (OUTPATIENT)
Dept: LAB | Facility: OTHER | Age: 52
End: 2022-06-16
Payer: COMMERCIAL

## 2022-06-16 NOTE — PROGRESS NOTES
Called patient to contact ordering provider (Dr. Frey) for an add-on order from 5/16 specimen, [id number] for Susceptibilities from [facility].

## 2022-06-17 LAB
ACID FAST STAIN (ARUP): ABNORMAL
ORGANISM (ARUP): ABNORMAL

## 2022-06-21 LAB
ACID FAST STAIN (ARUP): NORMAL

## 2022-06-22 LAB
ACID FAST STAIN (ARUP): NORMAL

## 2022-06-23 LAB
ACID FAST STAIN (ARUP): NORMAL

## 2022-07-08 LAB
ACID FAST STAIN (ARUP): NORMAL

## 2023-04-22 ENCOUNTER — HEALTH MAINTENANCE LETTER (OUTPATIENT)
Age: 53
End: 2023-04-22

## 2024-06-29 ENCOUNTER — HEALTH MAINTENANCE LETTER (OUTPATIENT)
Age: 54
End: 2024-06-29

## (undated) RX ORDER — ALBUTEROL SULFATE 0.83 MG/ML
SOLUTION RESPIRATORY (INHALATION)
Status: DISPENSED
Start: 2021-11-02